# Patient Record
Sex: FEMALE | Race: OTHER
[De-identification: names, ages, dates, MRNs, and addresses within clinical notes are randomized per-mention and may not be internally consistent; named-entity substitution may affect disease eponyms.]

---

## 2022-01-05 ENCOUNTER — HOSPITAL ENCOUNTER (OUTPATIENT)
Dept: HOSPITAL 15 - LDRP | Age: 27
Setting detail: OBSERVATION
Discharge: HOME | End: 2022-01-05
Attending: OBSTETRICS & GYNECOLOGY | Admitting: OBSTETRICS & GYNECOLOGY
Payer: COMMERCIAL

## 2022-01-05 VITALS — HEIGHT: 62 IN | BODY MASS INDEX: 51.53 KG/M2 | WEIGHT: 280 LBS

## 2022-01-05 DIAGNOSIS — Z79.899: ICD-10-CM

## 2022-01-05 DIAGNOSIS — R10.9: ICD-10-CM

## 2022-01-05 DIAGNOSIS — Z3A.21: ICD-10-CM

## 2022-01-05 DIAGNOSIS — N39.0: ICD-10-CM

## 2022-01-05 DIAGNOSIS — M54.50: ICD-10-CM

## 2022-01-05 DIAGNOSIS — O23.42: Primary | ICD-10-CM

## 2022-01-05 LAB
ALCOHOL, URINE: < 3 MG/DL (ref 0–10)
AMPHETAMINES UR QL SCN: NEGATIVE
BARBITURATES UR QL SCN: NEGATIVE
BENZODIAZ UR QL SCN: NEGATIVE
BZE UR QL SCN: NEGATIVE
CANNABINOIDS UR QL SCN: NEGATIVE
OPIATES UR QL SCN: NEGATIVE
PCP UR QL SCN: NEGATIVE

## 2022-01-05 PROCEDURE — 80307 DRUG TEST PRSMV CHEM ANLYZR: CPT

## 2022-01-05 PROCEDURE — 81001 URINALYSIS AUTO W/SCOPE: CPT

## 2022-01-05 PROCEDURE — 59025 FETAL NON-STRESS TEST: CPT

## 2022-01-05 PROCEDURE — 96372 THER/PROPH/DIAG INJ SC/IM: CPT

## 2022-01-05 PROCEDURE — 76815 OB US LIMITED FETUS(S): CPT

## 2022-01-05 PROCEDURE — 81002 URINALYSIS NONAUTO W/O SCOPE: CPT

## 2025-03-24 ENCOUNTER — HOSPITAL ENCOUNTER (EMERGENCY)
Dept: HOSPITAL 15 - ER | Age: 30
LOS: 1 days | Discharge: HOME | End: 2025-03-25
Payer: COMMERCIAL

## 2025-03-24 VITALS — HEIGHT: 64 IN | BODY MASS INDEX: 42.61 KG/M2 | WEIGHT: 249.56 LBS
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
## 2025-03-24 DIAGNOSIS — Z98.890: ICD-10-CM
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
## 2025-03-24 DIAGNOSIS — S80.02XA: ICD-10-CM
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
## 2025-03-24 DIAGNOSIS — R10.13: ICD-10-CM

## 2025-03-24 DIAGNOSIS — S39.012A: Primary | ICD-10-CM
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
## 2025-03-24 LAB
ALBUMIN SERPL-MCNC: 4.7 G/DL (ref 3.2–4.8)
ALP SERPL-CCNC: 86 U/L (ref 46–116)
ALT SERPL-CCNC: 40 U/L (ref 7–40)
ANION GAP SERPL CALCULATED.3IONS-SCNC: 13 MMOL/L (ref 5–15)
BILIRUB SERPL-MCNC: 0.5 MG/DL (ref 0.2–1)
BUN SERPL-MCNC: 13 MG/DL (ref 9–23)
BUN/CREAT SERPL: 17.3 (ref 10–20)
CALCIUM SERPL-MCNC: 10.1 MG/DL (ref 8.7–10.4)
CHLORIDE SERPL-SCNC: 104 MMOL/L (ref 98–107)
CO2 SERPL-SCNC: 24 MMOL/L (ref 20–31)
GLUCOSE SERPL-MCNC: 91 MG/DL (ref 74–106)
HCT VFR BLD AUTO: 42.8 % (ref 36–46)
HGB BLD-MCNC: 14.5 G/DL (ref 12.2–16.2)
MCH RBC QN AUTO: 27.1 PG (ref 28–32)
MCV RBC AUTO: 80.2 FL (ref 80–100)
NRBC BLD QL AUTO: 0.2 %
POTASSIUM SERPL-SCNC: 3.5 MMOL/L (ref 3.5–5.1)
PROT SERPL-MCNC: 7.5 G/DL (ref 5.7–8.2)
SODIUM SERPL-SCNC: 141 MMOL/L (ref 136–145)

## 2025-03-24 PROCEDURE — 74177 CT ABD & PELVIS W/CONTRAST: CPT

## 2025-03-24 PROCEDURE — 36415 COLL VENOUS BLD VENIPUNCTURE: CPT

## 2025-03-24 PROCEDURE — 99285 EMERGENCY DEPT VISIT HI MDM: CPT

## 2025-03-24 PROCEDURE — 72131 CT LUMBAR SPINE W/O DYE: CPT

## 2025-03-24 PROCEDURE — 80053 COMPREHEN METABOLIC PANEL: CPT

## 2025-03-24 PROCEDURE — 85025 COMPLETE CBC W/AUTO DIFF WBC: CPT

## 2025-03-24 PROCEDURE — 73562 X-RAY EXAM OF KNEE 3: CPT

## 2025-03-24 RX ADMIN — ONDANSETRON ONE MG: 4 TABLET, ORALLY DISINTEGRATING ORAL at 23:34

## 2025-03-24 RX ADMIN — HYDROCODONE BITARTRATE AND ACETAMINOPHEN ONE TAB: 5; 325 TABLET ORAL at 23:34

## 2025-03-24 RX ADMIN — IOHEXOL ONE MG: 300 INJECTION, SOLUTION INTRAVENOUS at 22:02

## 2025-03-24 NOTE — ED.PDOC
GI ASSESSMENT


HPI Comments


30 y.o female presents to the ED for a chief complaint of mid back, upper 

abdominal, and left knee pain s/p mechanical fall today at the [employer] x [employer]. 

Patient reports no head injuries or LOC. Patient reports having gastric bypass 

surgery done x 2 weeks ago and is concerned due to abdominal pain. Patient 

reports left knee is swollen but has full ROM. Patient deneis any dizziness, 

chest pain, fever, chills, nausea, vomiting. Patient denies any other medical 

history or known allergies.


Chief Complaint:  Fall Injury


Time Seen by MD:  21:47


Reviewed Notes:  Nurses Notes, Medications, Allergies


Allergies:  


Coded Allergies:  


     NO KNOWN ALLERGIES (Unverified , 1/5/22)


Home Meds


Reported Medications


Aspirin (Aspirin 81 Low Dose) 81 Mg Chw, 81 MG PO for PREVENTIVE, TAB.CHEW


   1/5/22


Prenatal Vit W/ Ferrous Fumara (Prenatal Vitamins Plus Lo 27-1 mg) 1 Tab Tab, 1 

TAB PO for SUPPLEMENT, TAB


   1/5/22


Information Source:  Patient


Mode of Arrival:  Ambulatory


Duration:  Since onset


Quality:  Sharp


Vomitus:  None


Stool:  Normal


Severity:  Moderate


Recent:  None


Recent Hx of:  None


Pain Location:  Epigastric, None


Modifying Factors:  Nothing


Associated sign and symptoms:  Abdominal Pain





Past Medical History


PAST MEDICAL HISTORY:  Denies


Surgical History (Other):  


gastric bypass


GYN History:  No Pertinent GYN History





Family History


Family History:  Reviewed,noncontributory to illness





Social History


Smoker:  Non-Smoker


Alcohol:  Denies ETOH Use


Drugs:  Denies Drug Use


Lives In:  Home





Constitutional:  denies: chills, diaphoresis, fatigue, fever, malaise, sweats, 

weakness, others


EENTM:  denies: blurred vision, double vision, ear bleeding, ear discharge, ear 

drainage, ear pain, ear ringing, eye pain, eye redness, hearing loss, mouth 

pain, mouth swelling, nasal discharge, nose bleeding, nose congestion, nose 

pain, photophobia, tearing, throat pain, throat swelling, voice changes, others


Respiratory:  denies: cough, hemoptysis, orthopnea, SOB at rest, shortness of 

breath, SOB with excertion, stridor, wheezing, others


Cardiovascular:  denies: chest pain, dizzy spells, diaphoresis, Dyspnea on 

exertion, edema, irregular heart beat, left arm pain, lightheadedness, 

palpitations, PND, syncope, others


Gastrointestinal:  reports: abdominal pain; denies: abdomen distended, blood 

streaked bowels, constipated, diarrhea, dysphagia, difficulty swallowing, 

hematemesis, melena, nausea, poor appetite, poor fluid intake, rectal bleeding, 

rectal pain, vomiting, others


Genitourinary:  denies: abnormal vagina bleeding, burning, dyspareunia, dysuria,

flank pain, frequency, hematuria, incontinence, pain, pregnant, vagina 

discharge, urgency, others


Neurological:  denies: dizziness, fainting, headache, left sided numbness, left 

sided weakness, numbness, paresthesia, pre-existing deficit, right sided 

numbness, right sided weakness, seizure, speech problems, tingling, tremors, 

weakness, others


Musculoskeletal:  reports: back pain, others (left knee pain with swelling ); 

denies: gout, joint pain, joint swelling, muscle pain, muscle stiffness, neck 

pain


Integumetry:  denies: bruises, change in color, change in hair/nails, dryness, 

laceration, lesions, lumps, rash, wounds, others


Allergic/Immunocompromised:  denies: Difficulty Healing, Frequent Infections, 

Hives, Itching, others


Hematologic/Lymphatic:  denies: anemia, blood clots, easy bleeding, easy 

bruising, swollen glands, others


Endocrine:  denies: excessive hunger, excessive sweating, excessive thirst, 

excessive urination, flushing, intolerance to cold, intolerance to heat, 

unexplained weight gain, unexplained weight loss, others


Psychiatric:  denies: anxiety, bipolar disorder, depression, hopeless, panic 

disorder, schizophrenia, sleepless, suicidal, others


All Other Systems:  Reviewed and Negative





Physical Exam


General Appearance:  No Apparent Distress, Normal


HEENT:  Normal ENT Inspection, Pharynx Normal, TMs Normal


Neck:  Full Range of Motion, Non-Tender, Normal, Normal Inspection


Respiratory:  Chest Non-Tender, Lungs Clear, No Accessory Muscle Use, No Respir

atory Distress, Normal Breath Sounds


Cardiovascular:  No Edema, No JVD, No Murmur, No Gallop, Normal Peripheral 

Pulses, Regular Rate/Rhythm


Breast Exam:  Deferred


Gastrointestinal:  Normal Bowel Sounds, Tenderness (right/mid sided abdominal 

tenderness mild )


Genitalia:  Deferred


Pelvic:  Deferred


Rectal:  Deferred


Extremities:  Normal inspection, Normal range of motion, Swelling (left knee 

with mild tenderness; normal ROM )


Musculoskeletal :  


   Location:  Bilateral


   Extremity Location:  Back (negative spinal medial tenderness, negative 

ecchymosis)


   Apperance:  Tenderness: Moderate


Neurologic:  Alert, CNs II-XII nml as Tested, No Motor Deficits, Normal Mood, No

Sensory Deficits


Cerebellar Function:  Normal


Reflexes:  Normal


Skin:  Dry, Normal Color, Warm


Lymphatic:  NOT DONE





Was a procedure done?


Was a procedure done?:  No





GI differential Dx


Differential Diagnosis:  Bowel Obstruction, Esophagitis, Gastroenteritis, GI 

hemorrhage, Inflammatory BD, Trauma intraabdominal





X-Ray, Labs, Meds, VS





                                   Vital Signs








  Date Time  Temp Pulse Resp B/P (MAP) Pulse Ox O2 Delivery O2 Flow Rate FiO2


 


3/25/25 00:00 98.3 65 16 109/66 (80) 99   





 98.3       


 


3/25/25 00:00  65 16  99 Room Air* 0 21


 


3/24/25 21:25 99.0 75 18 151/69 (96) 100   





 99.0       








                                       Lab








Test


 3/24/25


22:10 Range/Units


 


 


White Blood Count


 10.5 


 4.4-10.8


10^3/uL


 


Red Blood Count


 5.34 H


 4.0-5.20


10^6/uL


 


Hemoglobin 14.5  12.2-16.2  g/dL


 


Hematocrit 42.8  36.0-46.0  %


 


Mean Corpuscular Volume 80.2  80.0-100.0  fL


 


Mean Corpuscular Hemoglobin 27.1 L 28.0-32.0  pg


 


Mean Corpuscular Hemoglobin


Concent 33.8 


 32.0-36.0  g/dL





 


Red Cell Distribution Width 15.2 H 11.8-14.3  %


 


Platelet Count


 320 


 140-450


10^3/uL


 


Mean Platelet Volume 11.2 H 6.9-10.8  fL


 


Neutrophils (%) (Auto) 61.4  37.0-80.0  %


 


Lymphocytes (%) (Auto) 28.4  10.0-50.0  %


 


Monocytes (%) (Auto) 6.9  0.0-12.0  %


 


Eosinophils (%) (Auto) 2.6  0.0-7.0  %


 


Basophils (%) (Auto) 0.7  0.0-2.0  %


 


Neutrophils # (Auto)


 6.4 


 1.6-8.6  10


^3/uL


 


Lymphocytes # (Auto)


 3.0 


 0.4-5.4  10


^3/uL


 


Monocytes # (Auto) 0.7  0-1.3  10 ^3/uL


 


Eosinophils # (Auto) 0.3  0-0.8  10 ^3/uL


 


Basophils # (Auto) 0.1  0-0.2  10 ^3/uL


 


Nucleated Red Blood Cells 0.2   %


 


Platelet Estimate Adequate   


 


Giant Platelets Few   


 


Sodium Level 141  136-145  mmol/L


 


Potassium Level 3.5  3.5-5.1  mmol/L


 


Chloride Level 104    mmol/L


 


Carbon Dioxide Level 24  20-31  mmol/L


 


Anion Gap 13  5-15  


 


Blood Urea Nitrogen 13  9-23  mg/dL


 


Creatinine


 0.75 


 0.550-1.02


mg/dL


 


Glomerular Filtration Rate


Calc 111 


 >90  mL/min





 


BUN/Creatinine Ratio 17.3  10.0-20.0  


 


Serum Glucose 91    mg/dL


 


Calcium Level 10.1  8.7-10.4  mg/dL


 


Total Bilirubin 0.5  0.2-1.0  mg/dL


 


Aspartate Amino Transferase


(AST) 23 


 13-40  U/L





 


Alanine Aminotransferase (ALT) 40  7-40  U/L


 


Alkaline Phosphatase 86    U/L


 


Total Protein 7.5  5.7-8.2  g/dL


 


Albumin 4.7  3.2-4.8  g/dL








                               Current Medications








 Medications


  (Trade)  Dose


 Ordered  Sig/Rufina


 Route  Start Time


 Stop Time Status Last Admin


 


 Acetaminophen/


 Hydrocodone Bitart


  (Norco 5/325MG


 Tab)  1 tab  ONCE  ONCE


 PO  3/24/25 22:15


 3/24/25 22:16 DC 3/24/25 23:34





 


 Ondansetron HCl


  (Zofran Po)  4 mg  ONCE  ONCE


 PO  3/24/25 22:15


 3/24/25 22:16 DC 3/24/25 23:34











X-Ray, Labs, Meds, VS Comment


CT Abd/Pelv IMPRESSION: 





1. Status post gastric bypass.  No free air or evidence of rupture.





2. Hepatosplenomegaly











CT L Spine IMPRESSION: 





1. No CT evidence of acute fracture or traumatic mal-alignment of the bony 

lumbar spine.





2. Moderate disc space narrowing at T12-L1














MDM:


Patient with history as above presented with abdominal pain. History obtained 

from patient. Patient was nontoxic, stable, afebrile, ambulatory, no acute 

distress. Exam as above. Labs reviewed. CBC did not show leukocytosis.  No 

anemia.  CMP did not show any electrolyte abnormalities. Independently reviewed 

imaging.  Pelvis did not show acute abnormalities.  CT L-spine did not show 

acute fracture.  Left knee x-ray did not show acute fracture or dislocation. 

Reviewed external records. All findings were discussed with the patient.


Differential diagnosis considered. Overall presentation is consistent with 

abdominal strain and back strain. Low suspicion for fracture, intra-abdominal 

bleed.


Patient was treated with Norco with improvement in symptoms.


Patient was reevaluated and vital signs were reviewed. Consideration was given 

for admission, but the patient was stable for outpatient management.  Prescribed

patient lidocaine patches for outpatient treatment.








Disposition: Discussed the need to follow up diagnostics, including incidental 

findings. Discharged the patient with instructions to obtain outpatient follow 

up in 1-2 days of today's symptoms and findings, with strict return precautions 

if patient develops new or worsening symptoms.











This medical document was created using the Dragon [employer] dictation system.

Although this document has been carefully reviewed, there may still be some 

phonetic and typographical errors, which are due to imperfections of the 

software program, and do not reflect any compromise in the patient's medical 

care.


Time of 1ST Reevaluation:  22:14


Reevaluation 1ST:  Unchanged


Time of 2ND Reevaluation:  00:39


Reevaluation 2ND:  Improved


Patient Education/Counseling:  Diagnosis, Treatment, Prognosis


Family Education/Counseling:  No Family Present





Departure 1


Departure


Time of Disposition:  00:39


Impression:  


   Primary Impression:  


   Fall with no significant injury


   Qualified Codes:  W19.XXXA - Unspecified fall, initial encounter


   Additional Impressions:  


   Abdominal pain


   Qualified Codes:  R10.11 - Right upper quadrant pain


   Contusion of left knee


   Qualified Codes:  S80.02XA - Contusion of left knee, initial encounter


   Back strain


   Qualified Codes:  S39.012A - Strain of muscle, fascia and tendon of lower 

   back, initial encounter


Disposition:  01 HOME / SELF CARE / HOMELESS


Condition:  Fair


e-Prescriptions


Lidocaine (Lidocaine Patch 5%) 5 % Pad


5 % EX DAILY, #10 PAD


   Prov: TRAVON SCHULTZ PAC         3/25/25





Critical Care Note


Critical Care Time?:  No





Stability


Stability form required:  No





Heart Score


Heart Score:  








Heart Score Response (Comments) Value


 


History N/A 0


 


EKG N/A 0


 


Age N/A 0


 


Risk Factors N/A 0


 


Troponin N/A 0


 


Total  0














I personally scribed for TARIQ CEDEÑO MD ([email or web address]) on 3/24/25 at 22:14.  

Electronically submitted by Cinthia Sakrar ([facility]).





TRAVON SCHULTZ               Mar 24, 2025 22:09


TARIQ CEDEÑO MD              Mar 24, 2025 22:14

## 2025-03-24 NOTE — DVH
Exam: CT CT AB PEL WITH IV CON ONLY



History: R/o surgical site rupture



COMPARISON: None



Technique: Multidetector spiral CT of the abdomen and pelvis was performed from lung bases to pubic s
ymphysis.  Intravenous contrast was administered during this examination.  Portal venous  imaging was
 obtained.  Axial, coronal and sagittal multiplanar reformats were performed by the technologist on a
 separate workstation.



Radiation Dose : 



1. Abdomen/Pelvis:        CTDIvol 25.82mGy, DLP 1459.26 mGy*cm.



CONTRAST:



Type of contrast: Omni 300



Contrast injected: 100 ml 



Findings: 



Lung Bases: No acute or significant lung base finding.  Normal heart size. No pleural or pericardial 
effusion.  



Liver: Hepatomegaly measuring up to 21.5 cm. Diffuse steatosis



Gallbladder and Biliary Tree: Unremarkable



Spleen: Splenomegaly measuring up to 15.3 cm.



Pancreas: The pancreas is normal in appearance without focal lesions or abnormal enhancement.



Adrenal Glands: Unremarkable 



Kidneys:   No hydronephrosis.



Bladder: Unremarkable



Bowel: Status post gastric bypass. No free air or evidence of rupture. Small bowel and colon are norm
al in caliber and distribution.  The appendix is not visualized; however, no secondary findings of ac
mook appendicitis identified.



Ascites: Absent



Lymphadenopathy: No mesenteric, retroperitoneal or periportal lymphadenopathy. 



Abdominal Wall and Mesentery: Unremarkable.



Vasculature: The visualized abdominal aorta is normal in size and caliber.  Abdominal and pelvic vess
els demonstrate normal enhancement.



Pelvic Organs: Unremarkable



Musculoskeletal: No aggressive focal bony lesions, acute fractures or dislocation.   



IMPRESSION: 



1. Status post gastric bypass.  No free air or evidence of rupture.



2. Hepatosplenomegaly



Radiation optimization: All CT scans at this facility use at least one of these dose optimization roberto
hniques: automated exposure control  mA and/or kV adjustment per patient size (includes targeted exam
s where dose is matched to clinical indication)  or iterative reconstruction.



Electronically Signed by: Sudeep Smith at 03/24/2025 23:49:40 PM

## 2025-03-25 VITALS
SYSTOLIC BLOOD PRESSURE: 109 MMHG | HEART RATE: 65 BPM | DIASTOLIC BLOOD PRESSURE: 66 MMHG | TEMPERATURE: 98.3 F | OXYGEN SATURATION: 99 % | RESPIRATION RATE: 16 BRPM

## 2025-03-25 NOTE — DVH
CLINICAL INDICATION:    R/o fracture



TECHNIQUE:   XY left KNEE 3V XRAY



Comparison: None



FINDINGS/IMPRESSION: 



There is no evidence of acute fracture or dislocation.



Soft tissues are unremarkable.



Electronically Signed by: Sudeep Smith at 03/25/2025 01:36:17 AM